# Patient Record
Sex: MALE | Race: WHITE | NOT HISPANIC OR LATINO | URBAN - METROPOLITAN AREA
[De-identification: names, ages, dates, MRNs, and addresses within clinical notes are randomized per-mention and may not be internally consistent; named-entity substitution may affect disease eponyms.]

---

## 2017-04-21 ENCOUNTER — OUTPATIENT (OUTPATIENT)
Dept: OUTPATIENT SERVICES | Facility: HOSPITAL | Age: 22
LOS: 1 days | Discharge: HOME | End: 2017-04-21

## 2017-04-24 DIAGNOSIS — F10.10 ALCOHOL ABUSE, UNCOMPLICATED: ICD-10-CM

## 2018-07-08 ENCOUNTER — EMERGENCY (EMERGENCY)
Facility: HOSPITAL | Age: 23
LOS: 1 days | Discharge: HOME | End: 2018-07-08
Attending: EMERGENCY MEDICINE | Admitting: EMERGENCY MEDICINE
Payer: SELF-PAY

## 2018-07-08 VITALS
OXYGEN SATURATION: 100 % | HEART RATE: 78 BPM | DIASTOLIC BLOOD PRESSURE: 72 MMHG | RESPIRATION RATE: 16 BRPM | SYSTOLIC BLOOD PRESSURE: 136 MMHG | TEMPERATURE: 98 F

## 2018-07-08 PROCEDURE — 73564 X-RAY EXAM KNEE 4 OR MORE: CPT | Mod: 26,RT

## 2018-07-08 PROCEDURE — 99284 EMERGENCY DEPT VISIT MOD MDM: CPT

## 2018-07-08 RX ORDER — IBUPROFEN 200 MG
1 TABLET ORAL
Qty: 21 | Refills: 0 | OUTPATIENT
Start: 2018-07-08 | End: 2018-07-14

## 2018-07-08 RX ORDER — OXYCODONE AND ACETAMINOPHEN 5; 325 MG/1; MG/1
1 TABLET ORAL ONCE
Qty: 0 | Refills: 0 | Status: DISCONTINUED | OUTPATIENT
Start: 2018-07-08 | End: 2018-07-08

## 2018-07-08 RX ORDER — KETOROLAC TROMETHAMINE 30 MG/ML
30 SYRINGE (ML) INJECTION ONCE
Qty: 0 | Refills: 0 | Status: DISCONTINUED | OUTPATIENT
Start: 2018-07-08 | End: 2018-07-08

## 2018-07-08 RX ADMIN — OXYCODONE AND ACETAMINOPHEN 1 TABLET(S): 5; 325 TABLET ORAL at 14:10

## 2018-07-08 RX ADMIN — Medication 30 MILLIGRAM(S): at 14:10

## 2018-07-08 NOTE — ED PROVIDER NOTE - OBJECTIVE STATEMENT
21 y/o M w/ no pertinent PMH presents to ED c/o R knee pain s/p picking up boxes at work x3days ago. Pt states he was picking up a box when he heard a "Crunch" in his R knee. Pt reports to past dislocation of R knee s/p playing soccer. Pt is currently ambulatory in the ED but walking w/ crutches. He is unable to bear weight on R leg. Denies any other complaints. NKDA

## 2018-07-08 NOTE — ED PROVIDER NOTE - PROGRESS NOTE DETAILS
JOSÉ MIGUEL CAMARILLOID - pt neurovascularly intact. pain well controlled. unable to ambulate, will dc with ace and crutches, ortho f.u

## 2018-07-08 NOTE — ED PROVIDER NOTE - LOWER EXTREMITY EXAM, RIGHT
swelling and ttp, no deformities, limited ROM, unable to extend secondary to pain, No anterior or posterior drawer

## 2018-07-08 NOTE — ED PROVIDER NOTE - PLAN OF CARE
Rest, ice, elevate area.  Take Motrin 600mg every 6-8 hrs with food for pain.  Follow up with PMD within 48-72 hrs. Follow up with the Orthopedist doctor within the week to discuss possible MRI vs. Physical therapy- referral list given. Take Percocet every 6 hours as needed for severe pain- CAUTION do not drive, drink alcohol or handle heavy machinery as causes drowsiness.   Any worsening pain, swelling, weakness, numbness or any NEW CONCERNING symptoms return to the Emergency Dept.

## 2018-07-08 NOTE — ED PROVIDER NOTE - CARE PLAN
Principal Discharge DX:	Knee pain  Assessment and plan of treatment:	Rest, ice, elevate area.  Take Motrin 600mg every 6-8 hrs with food for pain.  Follow up with PMD within 48-72 hrs. Follow up with the Orthopedist doctor within the week to discuss possible MRI vs. Physical therapy- referral list given. Take Percocet every 6 hours as needed for severe pain- CAUTION do not drive, drink alcohol or handle heavy machinery as causes drowsiness.   Any worsening pain, swelling, weakness, numbness or any NEW CONCERNING symptoms return to the Emergency Dept.

## 2019-07-02 ENCOUNTER — EMERGENCY (EMERGENCY)
Facility: HOSPITAL | Age: 24
LOS: 1 days | Discharge: HOME | End: 2019-07-02
Attending: EMERGENCY MEDICINE | Admitting: EMERGENCY MEDICINE
Payer: SELF-PAY

## 2019-07-02 VITALS
SYSTOLIC BLOOD PRESSURE: 130 MMHG | TEMPERATURE: 98 F | DIASTOLIC BLOOD PRESSURE: 71 MMHG | HEART RATE: 86 BPM | OXYGEN SATURATION: 100 % | RESPIRATION RATE: 16 BRPM

## 2019-07-02 PROCEDURE — 29125 APPL SHORT ARM SPLINT STATIC: CPT | Mod: RT

## 2019-07-02 PROCEDURE — 73110 X-RAY EXAM OF WRIST: CPT | Mod: 26,RT

## 2019-07-02 PROCEDURE — 99284 EMERGENCY DEPT VISIT MOD MDM: CPT | Mod: 25

## 2019-07-02 PROCEDURE — 73130 X-RAY EXAM OF HAND: CPT | Mod: 26,RT

## 2019-07-02 PROCEDURE — 73200 CT UPPER EXTREMITY W/O DYE: CPT | Mod: 26,RT

## 2019-07-02 RX ORDER — IBUPROFEN 200 MG
400 TABLET ORAL ONCE
Refills: 0 | Status: COMPLETED | OUTPATIENT
Start: 2019-07-02 | End: 2019-07-02

## 2019-07-02 RX ORDER — ACETAMINOPHEN 500 MG
650 TABLET ORAL ONCE
Refills: 0 | Status: COMPLETED | OUTPATIENT
Start: 2019-07-02 | End: 2019-07-02

## 2019-07-02 RX ORDER — OXYCODONE HYDROCHLORIDE 5 MG/1
5 TABLET ORAL ONCE
Refills: 0 | Status: DISCONTINUED | OUTPATIENT
Start: 2019-07-02 | End: 2019-07-02

## 2019-07-02 RX ADMIN — OXYCODONE HYDROCHLORIDE 5 MILLIGRAM(S): 5 TABLET ORAL at 21:54

## 2019-07-02 RX ADMIN — Medication 650 MILLIGRAM(S): at 21:54

## 2019-07-02 RX ADMIN — Medication 400 MILLIGRAM(S): at 21:54

## 2019-07-02 NOTE — ED PROVIDER NOTE - OBJECTIVE STATEMENT
23M w/out pmh - p/w R medial hand and wrist pain. Fell off ladder at 6ft today onto R hand / wrist / forearm. No other pain elsewhere. No recent travel, medication change, illness, or hospitalization. Didn't take pain meds today.

## 2019-07-02 NOTE — ED PROVIDER NOTE - CLINICAL SUMMARY MEDICAL DECISION MAKING FREE TEXT BOX
23M w/ R hand/wrist trauma and pain / swelling - will r/o fx / dislocation, give pain meds, reassess

## 2019-07-02 NOTE — ED PROVIDER NOTE - PHYSICAL EXAMINATION
*GEN:   comfortable, in no acute distress, AOx3  *EYES:   pupils equally round and reactive to light, extra-occular movements intact  *HEENT:   airway patent, moist mucosal membranes, full ROM neck  *CV:   regular rate and rhythm  *RESP:   clear to auscultation bilaterally, non-labored  *ABD:   soft, non-tender  *:   no cva/flank tenderness  *EXTREM:   R medial hand tenderness and decr ROM R 5th finger, wrist swelling w/ mildly limited ROM, no leg swelling  *SKIN:   dry, intact  *NEURO:   AOx3, no focal weakness or loss of sensation, gait normal

## 2019-07-02 NOTE — ED PROVIDER NOTE - PROGRESS NOTE DETAILS
Neftali Cat PGY2: [facility], informed patient of finding Neftali Cat PGY2: rads read says no fracture; d/w rads resident -

## 2019-07-02 NOTE — ED ADULT TRIAGE NOTE - CHIEF COMPLAINT QUOTE
states" I fell off the ladder from about 6 feet high and landed on my right wrist and is swollen and hurts" denies LOC. swelling to right hand noted. denies use of any blood thinners. positive radial pulse noted. denies any other injuries.

## 2019-07-02 NOTE — ED ADULT NURSE REASSESSMENT NOTE - SYMPTOMS
c.o. rt hand pain going up into forearm. states he fell on hand earlier today. + rt radial pulse palpable. medicated for pain 6/10. Instructions given not to drive or drink alcohol after taking oxycodone, verbalized understanding.  states he will get ride home

## 2019-07-02 NOTE — ED PROVIDER NOTE - NSFOLLOWUPCLINICS_GEN_ALL_ED_FT
[facility]thopedics  .  [place]   Phone: [phone]  Fax:   Follow Up Time:

## 2019-07-03 RX ORDER — CEPHALEXIN 500 MG
1 CAPSULE ORAL
Qty: 15 | Refills: 0
Start: 2019-07-03

## 2019-07-03 RX ORDER — IBUPROFEN 200 MG
1 TABLET ORAL
Qty: 30 | Refills: 0
Start: 2019-07-03

## 2019-07-03 RX ORDER — OXYCODONE AND ACETAMINOPHEN 5; 325 MG/1; MG/1
1 TABLET ORAL ONCE
Refills: 0 | Status: DISCONTINUED | OUTPATIENT
Start: 2019-07-03 | End: 2019-07-03

## 2019-07-03 RX ADMIN — OXYCODONE AND ACETAMINOPHEN 1 TABLET(S): 5; 325 TABLET ORAL at 00:17

## 2019-09-09 ENCOUNTER — EMERGENCY (EMERGENCY)
Facility: HOSPITAL | Age: 24
LOS: 1 days | Discharge: HOME | End: 2019-09-09
Admitting: EMERGENCY MEDICINE
Payer: SELF-PAY

## 2019-09-09 VITALS
HEART RATE: 94 BPM | TEMPERATURE: 98 F | RESPIRATION RATE: 16 BRPM | SYSTOLIC BLOOD PRESSURE: 127 MMHG | OXYGEN SATURATION: 97 % | DIASTOLIC BLOOD PRESSURE: 71 MMHG

## 2019-09-09 PROCEDURE — 73110 X-RAY EXAM OF WRIST: CPT | Mod: 26,RT

## 2019-09-09 PROCEDURE — 99053 MED SERV 10PM-8AM 24 HR FAC: CPT

## 2019-09-09 PROCEDURE — 99284 EMERGENCY DEPT VISIT MOD MDM: CPT | Mod: 25

## 2019-09-09 PROCEDURE — 73130 X-RAY EXAM OF HAND: CPT | Mod: 26,RT

## 2019-09-09 PROCEDURE — 29125 APPL SHORT ARM SPLINT STATIC: CPT | Mod: RT

## 2019-09-09 NOTE — ED PROVIDER NOTE - PHYSICAL EXAMINATION
Pain with ROM/. +TTP, swelling to ulnar and volar side of hand over 4th and 5th metacarpal, with out evidence of obvious deformity, no tenting of skin. NVI intact distally.

## 2019-09-09 NOTE — ED PROVIDER NOTE - OBJECTIVE STATEMENT
24 yo M with h/o boxer fracture in the right, 22 yo M with h/o boxer fracture in the right, presents to the ED with complaint of pain again in the right hand with swelling after he began training again yesterday for boxing.Pain worse with movement. Pt left hand dominant. Denies numbness, tingling, weakness. Denies other injury.

## 2019-09-09 NOTE — ED PROVIDER NOTE - PATIENT PORTAL LINK FT
You can access the FollowMyHealth Patient Portal offered by [facility] by registering at the following website: [email or web address]. By joining [employer]’s FollowMyHealth portal, you will also be able to view your health information using other applications (apps) compatible with our system.

## 2019-09-09 NOTE — ED PROVIDER NOTE - PROGRESS NOTE DETAILS
xray impression: old boxers fracture. Clinical exam consistent with acute fracture. Spoke with Dr. Barajas. Placed pt in ulnar gutter and will follow with  Dr. Barajas.

## 2019-09-09 NOTE — ED PROVIDER NOTE - CARE PROVIDER_API CALL
Tyree Barajas (DO)  Plastic Surgery  [address] [place] [address]  Phone: [phone]  Fax: [phone]  Follow Up Time:

## 2019-09-09 NOTE — ED PROVIDER NOTE - CLINICAL SUMMARY MEDICAL DECISION MAKING FREE TEXT BOX
24 yo M with h/o boxer fracture in the right, presents to the ED with complaint of pain again in the right hand with swelling after he began training again yesterday for boxing. Pain worse with movement.  due to history, mechanism of injury, and swelling and tenderness, likely refracture, or new acute boxers fracture, will xray. pain control.

## 2019-09-09 NOTE — ED PROVIDER NOTE - NSFOLLOWUPINSTRUCTIONS_ED_ALL_ED_FT
Follow up with your Primary Medical Doctor within 2-3days. If results or reports were given to you, show copies of your reports given to you. Take all of your medications as previously prescribed.     Please follow with Dr. Barajas this week.     Do not use hand. Keep splint in place until you follow up.    Take Motrin 600mg every 8hrs with food for pain  Take Tylenol 650mg 1 tab every 4-6 hours as needed for pain

## 2019-09-09 NOTE — ED ADULT TRIAGE NOTE - CHIEF COMPLAINT QUOTE
pt w pain and swelling to Rt hand since yesterday. states he may have had a boxer's fracture to same hand in past, however never got it checked out and injured it again yesterday while boxing.

## 2020-02-03 ENCOUNTER — EMERGENCY (EMERGENCY)
Facility: HOSPITAL | Age: 25
LOS: 1 days | Discharge: LEFT BEFORE DISCHARGE | End: 2020-02-03
Admitting: EMERGENCY MEDICINE
Payer: SELF-PAY

## 2020-02-03 VITALS
TEMPERATURE: 98 F | RESPIRATION RATE: 20 BRPM | HEART RATE: 53 BPM | DIASTOLIC BLOOD PRESSURE: 61 MMHG | OXYGEN SATURATION: 100 % | SYSTOLIC BLOOD PRESSURE: 113 MMHG

## 2020-02-03 VITALS
HEART RATE: 86 BPM | SYSTOLIC BLOOD PRESSURE: 129 MMHG | DIASTOLIC BLOOD PRESSURE: 68 MMHG | TEMPERATURE: 98 F | OXYGEN SATURATION: 96 % | RESPIRATION RATE: 16 BRPM

## 2020-02-03 PROBLEM — S62.339A: Chronic | Status: ACTIVE | Noted: 2019-09-11

## 2020-02-03 PROCEDURE — 72100 X-RAY EXAM L-S SPINE 2/3 VWS: CPT | Mod: 26

## 2020-02-03 PROCEDURE — 99283 EMERGENCY DEPT VISIT LOW MDM: CPT

## 2020-02-03 RX ORDER — ACETAMINOPHEN 500 MG
650 TABLET ORAL ONCE
Refills: 0 | Status: COMPLETED | OUTPATIENT
Start: 2020-02-03 | End: 2020-02-03

## 2020-02-03 RX ADMIN — Medication 650 MILLIGRAM(S): at 15:51

## 2020-02-03 NOTE — ED PROVIDER NOTE - CLINICAL SUMMARY MEDICAL DECISION MAKING FREE TEXT BOX
24 year old male with low back pain s/p fall down steps 2 months ago. vitals reviewed. Ambulatory. Tenderness to right lumbar paraspinal muscles. No midline tenderness. Suspect muscle strain vs fracture. Will give analgesia and xray lumbar spine.

## 2020-02-03 NOTE — ED PROVIDER NOTE - CONSTITUTIONAL, MLM
normal... Well appearing, awake, alert, oriented to person, place, time/situation and in no apparent distress. Ambulatory without difficulty.

## 2020-02-03 NOTE — ED PROVIDER NOTE - MUSCULOSKELETAL MINIMAL EXAM
Tenderness to right lumbar paraspinal muscles. No midline tenderness. FROM of bilateral upper and lower extremities and back. Ambulatory. Sensation intact in bilateral lower extremities. Strength 5/5 in bilateral lower extremities.

## 2020-02-03 NOTE — ED PROVIDER NOTE - PROGRESS NOTE DETAILS
Patient states he has to go  his daughter and cannot wait in the ER anymore. He did not want to wait for discharge papers. He eloped. Called and spoke to patient at [phone] (he provided this number) and discussed with him the preliminary Xray results which were negative. Advised him to follow up with an Orthopedist for further management of his symptoms. He expressed understanding of all instructions.

## 2020-02-03 NOTE — ED ADULT TRIAGE NOTE - CHIEF COMPLAINT QUOTE
Pt. c/o right lower back pain s/o fall. States he jumped over gait and fell on back. Pt. noted a lump to area and has intermittent leg pain. Denies numbness to extremity. Ambulating well in triage.

## 2020-02-03 NOTE — ED PROVIDER NOTE - OBJECTIVE STATEMENT
24 year old male seen at 3:30pm with complaint of right sided low back pain s/p fall down 12 steps 2 months ago in November 2019. Tender right lumbar paraspinal muscles. Ambulatory without difficulty. 24 year old male seen at 3:30pm with complaint of right sided low back pain s/p fall down 12 steps 2 months ago in November 2019.   Denies bowel or bladder incontinence, numbness, tingling, paresthesias, urinary symptoms, difficulty ambulating, fever, wounds, head or neck injury.

## 2020-03-02 NOTE — ED PROVIDER NOTE - VASCULAR COMPROMISE
no vascular compromise No Repair - Repaired With Adjacent Surgical Defect Text (Leave Blank If You Do Not Want): After obtaining clear surgical margins the defect was repaired concurrently with another surgical defect which was in close approximation.

## 2022-03-28 NOTE — ED ADULT TRIAGE NOTE - PAIN RATING/NUMBER SCALE (0-10): ACTIVITY
Date last seen: 12/13/21    Date of next visit: Not scheduled    Med Requested:    Disp Refills Start End    cabergoline (DOSTINEX) 0.5 MG tablet 24 tablet 3 5/14/2021       Sig: TAKE 1 TABLET BY MOUTH 2  TIMES WEEKLY        Class: Eprescribe      Per LOV dated, 12/13/21, \"Probably should have a prolactin level and Testosterone  rechecked when ordering in the Spring\"    Routed to MD for review and approval.     8

## 2022-03-30 ENCOUNTER — EMERGENCY (EMERGENCY)
Facility: HOSPITAL | Age: 27
LOS: 1 days | Discharge: HOME | End: 2022-03-30
Admitting: EMERGENCY MEDICINE
Payer: SELF-PAY

## 2022-03-30 VITALS
HEART RATE: 84 BPM | TEMPERATURE: 98 F | RESPIRATION RATE: 16 BRPM | OXYGEN SATURATION: 100 % | SYSTOLIC BLOOD PRESSURE: 116 MMHG | DIASTOLIC BLOOD PRESSURE: 71 MMHG

## 2022-03-30 PROCEDURE — 99283 EMERGENCY DEPT VISIT LOW MDM: CPT

## 2022-03-30 RX ORDER — OFLOXACIN 0.3 %
1 DROPS OPHTHALMIC (EYE)
Refills: 0 | Status: DISCONTINUED | OUTPATIENT
Start: 2022-03-30 | End: 2022-04-03

## 2022-03-30 RX ADMIN — Medication 1 DROP(S): at 19:56

## 2022-03-30 NOTE — ED PROVIDER NOTE - NSFOLLOWUPCLINICS_GEN_ALL_ED_FT
[facility]  Ophthalmology  [address], [address]  [place] [address]  Phone: [phone]  Fax:   Follow Up Time: Urgent

## 2022-03-30 NOTE — ED ADULT TRIAGE NOTE - CHIEF COMPLAINT QUOTE
states" I got cement in my right eye around 11 am today and my eye is hurting a lot" states he irrigated his eye and used saline drops with no relief.

## 2022-03-30 NOTE — ED PROVIDER NOTE - OBJECTIVE STATEMENT
27 y/o M presents to ED with R eye pain after dust from scaffolding fell into eye. Pt tried irrigating out eye without relief. Pt feels like something is still in eye. No vision changes. No headache, dizziness.

## 2022-03-30 NOTE — ED PROVIDER NOTE - NSFOLLOWUPINSTRUCTIONS_ED_ALL_ED_FT
Apply Ocuflox drops to Right eye as directed - 1-2 drops every 4 hours to Right eye    Corneal Abrasion    The cornea is the clear covering at the front and center of the eye. This very thin tissue is made up of many layers. If a scratch or injury causes the corneal epithelium to come off, it is called a corneal abrasion. Symptoms include eye pain, redness, tearing, difficulty keeping eye open, and light sensitivity. Do not drive or operate machinery if your eye is patched.  Antibiotic eye drops may be prescribed to reduce the risk of infection.  It is important to follow up with an ophthalmologist (eye doctor) to ensure proper healing.    SEEK IMMEDIATE MEDICAL CARE IF YOU HAVE ANY OF THE FOLLOWING SYMPTOMS: discharge from eyes, changes in vision, fever, or swelling.

## 2022-03-30 NOTE — ED PROVIDER NOTE - CLINICAL SUMMARY MEDICAL DECISION MAKING FREE TEXT BOX
R eye foreign body - no foreign body visualized on exam  Saulo lens applied to R eye for irrigation  plan to follow up with Ophthalmology clinic tomorrow

## 2022-03-30 NOTE — ED PROVIDER NOTE - PROGRESS NOTE DETAILS
Pt feeling much improved after chad lens irrigation. plan to d/c with Ocuflox. Follow up with Ophthalmology clinic. Referral given.

## 2022-03-30 NOTE — ED PROVIDER NOTE - PATIENT PORTAL LINK FT
You can access the FollowMyHealth Patient Portal offered by [facility] by registering at the following website: [email or web address]. By joining [employer]’s FollowMyHealth portal, you will also be able to view your health information using other applications (apps) compatible with our system.

## 2023-07-18 ENCOUNTER — EMERGENCY (EMERGENCY)
Facility: HOSPITAL | Age: 28
LOS: 1 days | Discharge: HOME | End: 2023-07-18
Admitting: STUDENT IN AN ORGANIZED HEALTH CARE EDUCATION/TRAINING PROGRAM
Payer: SELF-PAY

## 2023-07-18 VITALS
TEMPERATURE: 98 F | SYSTOLIC BLOOD PRESSURE: 130 MMHG | OXYGEN SATURATION: 97 % | HEART RATE: 98 BPM | RESPIRATION RATE: 16 BRPM | DIASTOLIC BLOOD PRESSURE: 88 MMHG

## 2023-07-18 PROCEDURE — 99284 EMERGENCY DEPT VISIT MOD MDM: CPT | Mod: 25

## 2023-07-18 PROCEDURE — 29125 APPL SHORT ARM SPLINT STATIC: CPT | Mod: RT

## 2023-07-18 PROCEDURE — 73110 X-RAY EXAM OF WRIST: CPT | Mod: 26,RT

## 2023-07-18 PROCEDURE — 73130 X-RAY EXAM OF HAND: CPT | Mod: 26,RT

## 2023-07-18 NOTE — ED ADULT NURSE NOTE - OBJECTIVE STATEMENT
pt received to [facility] with c/o pain and swelling to R hand since Friday s/p punching an object. +radial pulses. pt has limited ROM to 3rd finger, +cap refill. pt noted to smell of alochol. pt calm and corporative. seen by NP. no new orders at this time

## 2023-07-18 NOTE — ED ADULT TRIAGE NOTE - ESI TRIAGE ACUITY LEVEL, MLM
4 [Well Developed] : well developed [Well Nourished] : well nourished [No Acute Distress] : no acute distress [Normal Conjunctiva] : normal conjunctiva [Normal Venous Pressure] : normal venous pressure [No Carotid Bruit] : no carotid bruit [Normal S1, S2] : normal S1, S2 [No Murmur] : no murmur [No Rub] : no rub [No Gallop] : no gallop [Clear Lung Fields] : clear lung fields [Good Air Entry] : good air entry [No Respiratory Distress] : no respiratory distress  [Soft] : abdomen soft [Non Tender] : non-tender [No Masses/organomegaly] : no masses/organomegaly [Normal Bowel Sounds] : normal bowel sounds [Normal Gait] : normal gait [No Edema] : no edema [No Cyanosis] : no cyanosis [No Clubbing] : no clubbing [No Varicosities] : no varicosities [No Rash] : no rash [No Skin Lesions] : no skin lesions [Moves all extremities] : moves all extremities [No Focal Deficits] : no focal deficits [Normal Speech] : normal speech [Alert and Oriented] : alert and oriented [Normal memory] : normal memory

## 2023-07-18 NOTE — ED PROVIDER NOTE - CLINICAL SUMMARY MEDICAL DECISION MAKING FREE TEXT BOX
28 yo male with previous PMHx of Boxer's fracture, presents to ED with complaints of right hand pain and deformity s/p punching a punching bag. Will obtain xray to RO boxer's fracture, follow up with ortho

## 2023-07-18 NOTE — ED PROVIDER NOTE - PATIENT PORTAL LINK FT
You can access the FollowMyHealth Patient Portal offered by [facility] by registering at the following website: [email or web address]. By joining [employer]’s FollowMyHealth portal, you will also be able to view your health information using other applications (apps) compatible with our system.

## 2023-07-18 NOTE — ED ADULT TRIAGE NOTE - CHIEF COMPLAINT QUOTE
Pt states he was punching an arcade game bag on Friday, developed pain and swelling to right hand. No bruising or lacerations noted.

## 2023-07-18 NOTE — ED PROVIDER NOTE - OBJECTIVE STATEMENT
26 yo male with no PMHx presenting to ED with complaints of right hand injury. Patient states he was punching a punching bag and hit the surface incorrectly. He now complains of swelling and pain, with decreased  strength

## 2023-07-18 NOTE — ED PROVIDER NOTE - NSFOLLOWUPINSTRUCTIONS_ED_ALL_ED_FT
-Please follow up in clinic, information provided  -Take Ibuprofen 600mg (3 tabs)  AND Tylenol 1,000 mg (2 tabs) every 8 hrs for pain  - Do NOT remove splint, cover to shower.

## 2023-09-08 NOTE — ED ADULT TRIAGE NOTE - LOCATION:
Right arm; Zoryve Counseling:  I discussed with the patient that Zoryve is not for use in the eyes, mouth or vagina. The most commonly reported side effects include diarrhea, headache, insomnia, application site pain, upper respiratory tract infections, and urinary tract infections.  All of the patient's questions and concerns were addressed.

## 2024-12-10 NOTE — ED ADULT NURSE NOTE - AS PAIN REST
7 (severe pain)
PROVIDER:[TOKEN:[[id number]]]
86 y.o male A&Ox4 BIBEMS w/ c/o R forearm pain. Pt reports having a catheterization procedure on "11/22 or 11/23." Pt sent from cardiologist's office for evaluation.  +radial pulse in right arm. Pt describes pain as constant. Hx afib, CAD. Pt denies chest pain, SOB. Pt speaking in clear, full sentences, respirations even and unlabored.

## 2025-02-20 ENCOUNTER — EMERGENCY (EMERGENCY)
Facility: HOSPITAL | Age: 30
LOS: 1 days | Discharge: HOME | End: 2025-02-20
Admitting: EMERGENCY MEDICINE
Payer: MEDICAID

## 2025-02-20 VITALS
OXYGEN SATURATION: 100 % | DIASTOLIC BLOOD PRESSURE: 84 MMHG | RESPIRATION RATE: 20 BRPM | SYSTOLIC BLOOD PRESSURE: 131 MMHG | HEART RATE: 96 BPM | TEMPERATURE: 98 F | WEIGHT: 220.02 LBS

## 2025-02-20 PROCEDURE — 99285 EMERGENCY DEPT VISIT HI MDM: CPT

## 2025-02-20 PROCEDURE — 73564 X-RAY EXAM KNEE 4 OR MORE: CPT | Mod: 26,RT

## 2025-02-20 PROCEDURE — 73110 X-RAY EXAM OF WRIST: CPT | Mod: 26,RT

## 2025-02-20 PROCEDURE — 73502 X-RAY EXAM HIP UNI 2-3 VIEWS: CPT | Mod: 26,RT

## 2025-02-20 PROCEDURE — 73130 X-RAY EXAM OF HAND: CPT | Mod: 26,RT

## 2025-02-20 PROCEDURE — 73700 CT LOWER EXTREMITY W/O DYE: CPT | Mod: 26,RT

## 2025-02-20 PROCEDURE — 73552 X-RAY EXAM OF FEMUR 2/>: CPT | Mod: 26,RT

## 2025-02-20 NOTE — ED PROVIDER NOTE - CLINICAL SUMMARY MEDICAL DECISION MAKING FREE TEXT BOX
29-year-old male with no significant past medical history presents to the ER with complaint of right knee pain and right wrist and hand pain after a slip and fall on ice, 2 days ago.  Pain with ambulation, ambulating with limp denies head strike, trauma, dizziness, lightheadedness, chest pain, shortness of breath, abdominal pain, weakness, numbness, tingling.  Patient reports had meniscal repair in the right knee previously.  r/o fracture   ortho f/u   declining pain meds at this time.

## 2025-02-20 NOTE — ED PROVIDER NOTE - NSFOLLOWUPINSTRUCTIONS_ED_ALL_ED_FT
Follow up with ortho this week.     Referral list provided.      Rest, ice and elevate knee.      Ambulate as tolerated    Take Motrin 600mg every 8 hrs for pain with food.     Worsening pain, swelling, weakness, numbness return to ER

## 2025-02-20 NOTE — ED PROVIDER NOTE - PROGRESS NOTE DETAILS
JOSÉ MIGUEL Deluca: Received signout from JOSÉ MIGUEL Chavez.  X-ray shows right femoral lateral condyle fracture.  CT scan shows fracture of the right femoral condyle.  Ortho came and seen and evaluated patient in which she was placed in a knee immobilizer and was advised to follow-up with Dr. Nolasco in 1 week for further evaluation.  Imaging discussed and reviewed with patient.  Strict return precautions given upon discharge.

## 2025-02-20 NOTE — ED PROVIDER NOTE - PHYSICAL EXAMINATION
Well appearing, well nourished, awake, alert, oriented to person, place, time/situation and in no apparent distress.    Airway patent    Eyes without scleral injection. No jaundice.    Strong pulse.    Respirations unlabored.    Spine appears normal, range of motion is not limited, right wrist and hand with mild swelling, no open wounds, FROM, TTP to ulnar wrist, 5th metacarpal. Right knee: swelling noted, supra patellar and posterior, ROM in tact, with anterior abrasion, TTP posterior knee without ligamental laxity.   Other joints ROM intact, NT, no deformity or swelling.   Alert and oriented, no gross motor or sensory deficits.    Skin normal color for race, warm, dry and intact. No evidence of rash.

## 2025-02-20 NOTE — ED PROVIDER NOTE - OBJECTIVE STATEMENT
29-year-old male with no significant past medical history presents to the ER with complaint of right knee pain and right wrist and hand pain after a slip and fall on ice, 2 days ago.  Pain with ambulation, ambulating with limp denies head strike, trauma, dizziness, lightheadedness, chest pain, shortness of breath, abdominal pain, weakness, numbness, tingling.  Patient reports had meniscal repair in the right knee previously.

## 2025-02-20 NOTE — ED PROVIDER NOTE - PATIENT PORTAL LINK FT
You can access the FollowMyHealth Patient Portal offered by  by registering at the following website: [email or web address]. By joining [employer]’s FollowMyHealth portal, you will also be able to view your health information using other applications (apps) compatible with our system.

## 2025-02-20 NOTE — ED PROVIDER NOTE - WET READ LAUNCH FT
At [facility], one important tool we use to improve our patient services is our Patient Survey.  Following your visit you may receive our survey in the mail.    Please take the time to complete the survey.    If your visit with us was great, we want to hear about it.    If we can improve, please let us know how.       Get your Prescription filled at [facility]!    [facility] uses the same medical record your doctor uses. More accurate and timely information for your doctor and your pharmacy means more effective and safer health care for you and your family.  [facility] accepts all of the major insurance plans which means you pay the same copay wherever you go.  [facility] Pharmacists take the time to explain your medication regimen to you.  [facility] will mail your medications to you free of postage and handling charges. We love lencho.       There are no Wet Read(s) to document.

## 2025-02-20 NOTE — ED PROVIDER NOTE - CARE PROVIDER_API CALL
Yunier Nolasco  Orthopaedic Trauma  [address] [place] [address]  Phone: [phone]  Fax: [phone]  Follow Up Time:

## 2025-02-21 VITALS
DIASTOLIC BLOOD PRESSURE: 72 MMHG | HEIGHT: 71 IN | SYSTOLIC BLOOD PRESSURE: 140 MMHG | TEMPERATURE: 98 F | RESPIRATION RATE: 18 BRPM | OXYGEN SATURATION: 100 % | HEART RATE: 80 BPM

## 2025-02-21 NOTE — CONSULT NOTE ADULT - SUBJECTIVE AND OBJECTIVE BOX
HPI  29yMale w/ pmh of right knee meniscal repair in 2023 by outside orthopedist c/o R knee pain s/p mechanical fall after slipping on ice two days ago. Difficulty bearing weight in the RLE since the fall. Denies headstrike or LOC. Denies numbness/tingling in the RLE. Denies any other trauma/injuries at this time. At baseline, community ambulator w/o assistive devices.    ROS  Negative unless otherwise specified in HPI.    PAST MEDICAL & SURGICAL Hx  PAST MEDICAL & SURGICAL HISTORY:  Boxers fracture      No significant past surgical history          MEDICATIONS  Home Medications:      ALLERGIES  No Known Allergies      FAMILY Hx  FAMILY HISTORY:      SOCIAL Hx  Social History:      VITALS  Vital Signs Last 24 Hrs  T(C): 36.7 (21 Feb 2025 00:08), Max: 36.8 (20 Feb 2025 17:14)  T(F): 98 (21 Feb 2025 00:08), Max: 98.2 (20 Feb 2025 17:14)  HR: 80 (21 Feb 2025 00:08) (80 - 96)  BP: 140/72 (21 Feb 2025 00:08) (131/84 - 140/72)  BP(mean): --  RR: 18 (21 Feb 2025 00:08) (18 - 20)  SpO2: 100% (21 Feb 2025 00:08) (100% - 100%)    Parameters below as of 21 Feb 2025 00:08  Patient On (Oxygen Delivery Method): room air        PHYSICAL EXAM  Gen: Lying in bed, NAD  Resp: No increased WOB  RLE:  Skin intact, +edema over R knee  +TTP over R knee, no TTP along remainder of extremity; compartments soft  Limited ROM at knee 2/2 pain  No gross varus/valgus laxity,  Motor: TA/EHL/GS/FHL intact  Sensory: DP/SP/Tib/Cheng/Saph SILT  +DP pulse (symmetric relative to contralateral side), WWP    Secondary survey:  No TTP along spine or other extremities, pelvis grossly stable, SILT and compartments soft throughout    LABS              IMAGING  XR/CT: non-displaced fx of right femur lateral condyle     ASSESSMENT & PLAN  29yMale w/ R distal femur lateral condyle fx s/p immobilization.  -NWB RLE in a BJKI with crutches  -No acute orthopedic surgical intervention  -pain control  -ice/cold compress, elevation  -Information provided for follow up in one week with Dr. Nolasco, although patient expressed interest to return to his regular orthopedic surgeon

## 2025-02-21 NOTE — ED ADULT NURSE REASSESSMENT NOTE - NS ED NURSE REASSESS COMMENT FT1
pt remains at baseline mental status A&OX3, RR even unlabored completing full clear sentences. pt resting in stretcher comfortably at this time, no new complaints offered. ortho at bedside applying splint to pt R leg. stretcher lowest position siderails up safety measures in place. pending disposition

## 2025-08-22 ENCOUNTER — EMERGENCY (EMERGENCY)
Facility: HOSPITAL | Age: 30
LOS: 1 days | End: 2025-08-22
Attending: STUDENT IN AN ORGANIZED HEALTH CARE EDUCATION/TRAINING PROGRAM | Admitting: STUDENT IN AN ORGANIZED HEALTH CARE EDUCATION/TRAINING PROGRAM
Payer: MEDICAID

## 2025-08-22 VITALS
DIASTOLIC BLOOD PRESSURE: 80 MMHG | SYSTOLIC BLOOD PRESSURE: 129 MMHG | HEART RATE: 76 BPM | HEIGHT: 71 IN | TEMPERATURE: 98 F | WEIGHT: 205.03 LBS | RESPIRATION RATE: 16 BRPM | OXYGEN SATURATION: 97 %

## 2025-08-22 PROCEDURE — 99284 EMERGENCY DEPT VISIT MOD MDM: CPT

## 2025-08-22 RX ORDER — OFLOXACIN 3 MG/ML
1 SOLUTION OPHTHALMIC
Qty: 2 | Refills: 0
Start: 2025-08-22 | End: 2025-08-26

## 2025-08-22 RX ORDER — LANOLIN/MINERAL OIL/PETROLATUM
1 OINTMENT (GRAM) OPHTHALMIC (EYE)
Qty: 1 | Refills: 0
Start: 2025-08-22 | End: 2025-08-26

## 2025-08-22 RX ORDER — FLUORESCEIN SODIUM 0.6 MG
1 STRIP OPHTHALMIC (EYE) ONCE
Refills: 0 | Status: COMPLETED | OUTPATIENT
Start: 2025-08-22 | End: 2025-08-22

## 2025-08-22 RX ORDER — TETRACAINE HYDROCHLORIDE 5 MG/ML
1 SOLUTION OPHTHALMIC ONCE
Refills: 0 | Status: COMPLETED | OUTPATIENT
Start: 2025-08-22 | End: 2025-08-22

## 2025-08-22 RX ORDER — LANOLIN/MINERAL OIL/PETROLATUM
1 OINTMENT (GRAM) OPHTHALMIC (EYE)
Refills: 0
Start: 2025-08-22

## 2025-08-22 RX ADMIN — TETRACAINE HYDROCHLORIDE 1 DROP(S): 5 SOLUTION OPHTHALMIC at 19:08

## 2025-08-22 RX ADMIN — Medication 1 APPLICATION(S): at 19:08
